# Patient Record
Sex: FEMALE | URBAN - METROPOLITAN AREA
[De-identification: names, ages, dates, MRNs, and addresses within clinical notes are randomized per-mention and may not be internally consistent; named-entity substitution may affect disease eponyms.]

---

## 2024-05-08 SDOH — HEALTH STABILITY: PHYSICAL HEALTH: ON AVERAGE, HOW MANY DAYS PER WEEK DO YOU ENGAGE IN MODERATE TO STRENUOUS EXERCISE (LIKE A BRISK WALK)?: 3 DAYS

## 2024-05-08 SDOH — HEALTH STABILITY: PHYSICAL HEALTH: ON AVERAGE, HOW MANY MINUTES DO YOU ENGAGE IN EXERCISE AT THIS LEVEL?: 30 MIN

## 2024-05-09 ENCOUNTER — OFFICE VISIT (OUTPATIENT)
Dept: FAMILY MEDICINE CLINIC | Age: 24
End: 2024-05-09
Payer: COMMERCIAL

## 2024-05-09 ENCOUNTER — HOSPITAL ENCOUNTER (OUTPATIENT)
Age: 24
Setting detail: SPECIMEN
Discharge: HOME | End: 2024-05-09

## 2024-05-09 VITALS
WEIGHT: 208.6 LBS | BODY MASS INDEX: 31.61 KG/M2 | HEIGHT: 68 IN | HEART RATE: 85 BPM | DIASTOLIC BLOOD PRESSURE: 68 MMHG | SYSTOLIC BLOOD PRESSURE: 108 MMHG | TEMPERATURE: 97.4 F | OXYGEN SATURATION: 97 %

## 2024-05-09 DIAGNOSIS — Z00.00 WELL ADULT EXAM: ICD-10-CM

## 2024-05-09 DIAGNOSIS — Z76.89 ENCOUNTER TO ESTABLISH CARE: Primary | ICD-10-CM

## 2024-05-09 DIAGNOSIS — D50.8 IRON DEFICIENCY ANEMIA SECONDARY TO INADEQUATE DIETARY IRON INTAKE: ICD-10-CM

## 2024-05-09 DIAGNOSIS — G43.C1 INTRACTABLE PERIODIC HEADACHE SYNDROME: ICD-10-CM

## 2024-05-09 DIAGNOSIS — F41.1 GAD (GENERALIZED ANXIETY DISORDER): ICD-10-CM

## 2024-05-09 DIAGNOSIS — F33.1 MODERATE EPISODE OF RECURRENT MAJOR DEPRESSIVE DISORDER (HCC): ICD-10-CM

## 2024-05-09 DIAGNOSIS — Z78.9 VEGETARIAN DIET: ICD-10-CM

## 2024-05-09 LAB
ALBUMIN SERPL-MCNC: 4.4 G/DL (ref 3.5–5.2)
ALBUMIN/GLOB SERPL: 1 {RATIO} (ref 1–2.5)
ALP SERPL-CCNC: 132 U/L (ref 35–104)
ALT SERPL-CCNC: 95 U/L (ref 10–35)
ANION GAP SERPL CALCULATED.3IONS-SCNC: 11 MMOL/L (ref 9–16)
AST SERPL-CCNC: 58 U/L (ref 10–35)
BASOPHILS # BLD: 0.07 K/UL (ref 0–0.2)
BASOPHILS NFR BLD: 1 % (ref 0–2)
BILIRUB SERPL-MCNC: 0.4 MG/DL (ref 0–1.2)
BUN SERPL-MCNC: 8 MG/DL (ref 6–20)
CALCIUM SERPL-MCNC: 9.4 MG/DL (ref 8.6–10.4)
CHLORIDE SERPL-SCNC: 103 MMOL/L (ref 98–107)
CHOLEST SERPL-MCNC: 157 MG/DL (ref 0–199)
CHOLESTEROL/HDL RATIO: 6
CO2 SERPL-SCNC: 25 MMOL/L (ref 20–31)
CREAT SERPL-MCNC: 0.6 MG/DL (ref 0.5–0.9)
EOSINOPHIL # BLD: 0.14 K/UL (ref 0–0.4)
EOSINOPHILS RELATIVE PERCENT: 2 % (ref 1–4)
ERYTHROCYTE [DISTWIDTH] IN BLOOD BY AUTOMATED COUNT: 14.6 % (ref 11.8–14.4)
EST. AVERAGE GLUCOSE BLD GHB EST-MCNC: 117 MG/DL
FERRITIN SERPL-MCNC: 12 NG/ML (ref 13–150)
FOLATE SERPL-MCNC: 9 NG/ML (ref 4.8–24.2)
GFR, ESTIMATED: >90 ML/MIN/1.73M2
GLUCOSE SERPL-MCNC: 93 MG/DL (ref 74–99)
HBA1C MFR BLD: 5.7 % (ref 4–6)
HCT VFR BLD AUTO: 40.9 % (ref 36.3–47.1)
HDLC SERPL-MCNC: 27 MG/DL
HGB BLD-MCNC: 12.3 G/DL (ref 11.9–15.1)
IMM GRANULOCYTES # BLD AUTO: 0 K/UL (ref 0–0.3)
IMM GRANULOCYTES NFR BLD: 0 %
IRON SATN MFR SERPL: 7 % (ref 20–55)
IRON SERPL-MCNC: 34 UG/DL (ref 37–145)
LDLC SERPL CALC-MCNC: 99 MG/DL (ref 0–100)
LYMPHOCYTES NFR BLD: 4.26 K/UL (ref 1–4.8)
LYMPHOCYTES RELATIVE PERCENT: 60 % (ref 24–44)
MCH RBC QN AUTO: 24.2 PG (ref 25.2–33.5)
MCHC RBC AUTO-ENTMCNC: 30.1 G/DL (ref 28.4–34.8)
MCV RBC AUTO: 80.5 FL (ref 82.6–102.9)
MONOCYTES NFR BLD: 0.43 K/UL (ref 0.1–0.8)
MONOCYTES NFR BLD: 6 % (ref 1–7)
MORPHOLOGY: ABNORMAL
MORPHOLOGY: ABNORMAL
NEUTROPHILS NFR BLD: 31 % (ref 36–66)
NEUTS SEG NFR BLD: 2.2 K/UL (ref 1.8–7.7)
PLATELET # BLD AUTO: 249 K/UL (ref 138–453)
PMV BLD AUTO: 11.4 FL (ref 8.1–13.5)
POTASSIUM SERPL-SCNC: 4.6 MMOL/L (ref 3.7–5.3)
PROT SERPL-MCNC: 7.6 G/DL (ref 6.6–8.7)
RBC # BLD AUTO: 5.08 M/UL (ref 3.95–5.11)
SODIUM SERPL-SCNC: 139 MMOL/L (ref 136–145)
TIBC SERPL-MCNC: 484 UG/DL (ref 250–450)
TRIGL SERPL-MCNC: 156 MG/DL (ref 0–149)
TSH SERPL DL<=0.05 MIU/L-ACNC: 0.95 UIU/ML (ref 0.27–4.2)
UNSATURATED IRON BINDING CAPACITY: 450 UG/DL (ref 112–347)
VIT B12 SERPL-MCNC: 445 PG/ML (ref 232–1245)
VLDLC SERPL CALC-MCNC: 31 MG/DL
WBC OTHER # BLD: 7.1 K/UL (ref 3.5–11.3)

## 2024-05-09 PROCEDURE — 99385 PREV VISIT NEW AGE 18-39: CPT

## 2024-05-09 RX ORDER — IBUPROFEN 800 MG/1
800 TABLET ORAL EVERY 8 HOURS PRN
COMMUNITY
Start: 2021-07-20

## 2024-05-09 RX ORDER — VENLAFAXINE HYDROCHLORIDE 75 MG/1
75 CAPSULE, EXTENDED RELEASE ORAL DAILY
COMMUNITY
Start: 2024-05-09

## 2024-05-09 RX ORDER — MECLIZINE HCL 12.5 MG/1
25 TABLET ORAL 4 TIMES DAILY PRN
COMMUNITY
Start: 2022-04-02

## 2024-05-09 RX ORDER — FEXOFENADINE HCL 60 MG/1
60 TABLET, FILM COATED ORAL PRN
COMMUNITY

## 2024-05-09 RX ORDER — ONDANSETRON 4 MG/1
4 TABLET, ORALLY DISINTEGRATING ORAL EVERY 8 HOURS PRN
COMMUNITY
Start: 2024-05-03

## 2024-05-09 SDOH — ECONOMIC STABILITY: FOOD INSECURITY: WITHIN THE PAST 12 MONTHS, THE FOOD YOU BOUGHT JUST DIDN'T LAST AND YOU DIDN'T HAVE MONEY TO GET MORE.: NEVER TRUE

## 2024-05-09 SDOH — ECONOMIC STABILITY: FOOD INSECURITY: WITHIN THE PAST 12 MONTHS, YOU WORRIED THAT YOUR FOOD WOULD RUN OUT BEFORE YOU GOT MONEY TO BUY MORE.: NEVER TRUE

## 2024-05-09 SDOH — ECONOMIC STABILITY: HOUSING INSECURITY
IN THE LAST 12 MONTHS, WAS THERE A TIME WHEN YOU DID NOT HAVE A STEADY PLACE TO SLEEP OR SLEPT IN A SHELTER (INCLUDING NOW)?: NO

## 2024-05-09 SDOH — ECONOMIC STABILITY: INCOME INSECURITY: HOW HARD IS IT FOR YOU TO PAY FOR THE VERY BASICS LIKE FOOD, HOUSING, MEDICAL CARE, AND HEATING?: NOT HARD AT ALL

## 2024-05-09 ASSESSMENT — PATIENT HEALTH QUESTIONNAIRE - PHQ9
4. FEELING TIRED OR HAVING LITTLE ENERGY: SEVERAL DAYS
SUM OF ALL RESPONSES TO PHQ QUESTIONS 1-9: 7
SUM OF ALL RESPONSES TO PHQ9 QUESTIONS 1 & 2: 3
SUM OF ALL RESPONSES TO PHQ QUESTIONS 1-9: 7
3. TROUBLE FALLING OR STAYING ASLEEP: SEVERAL DAYS
2. FEELING DOWN, DEPRESSED OR HOPELESS: MORE THAN HALF THE DAYS
9. THOUGHTS THAT YOU WOULD BE BETTER OFF DEAD, OR OF HURTING YOURSELF: SEVERAL DAYS
6. FEELING BAD ABOUT YOURSELF - OR THAT YOU ARE A FAILURE OR HAVE LET YOURSELF OR YOUR FAMILY DOWN: SEVERAL DAYS
10. IF YOU CHECKED OFF ANY PROBLEMS, HOW DIFFICULT HAVE THESE PROBLEMS MADE IT FOR YOU TO DO YOUR WORK, TAKE CARE OF THINGS AT HOME, OR GET ALONG WITH OTHER PEOPLE: VERY DIFFICULT
SUM OF ALL RESPONSES TO PHQ QUESTIONS 1-9: 7
8. MOVING OR SPEAKING SO SLOWLY THAT OTHER PEOPLE COULD HAVE NOTICED. OR THE OPPOSITE, BEING SO FIGETY OR RESTLESS THAT YOU HAVE BEEN MOVING AROUND A LOT MORE THAN USUAL: NOT AT ALL
7. TROUBLE CONCENTRATING ON THINGS, SUCH AS READING THE NEWSPAPER OR WATCHING TELEVISION: NOT AT ALL
SUM OF ALL RESPONSES TO PHQ QUESTIONS 1-9: 6
5. POOR APPETITE OR OVEREATING: NOT AT ALL
1. LITTLE INTEREST OR PLEASURE IN DOING THINGS: SEVERAL DAYS

## 2024-05-09 ASSESSMENT — COLUMBIA-SUICIDE SEVERITY RATING SCALE - C-SSRS
5. HAVE YOU STARTED TO WORK OUT OR WORKED OUT THE DETAILS OF HOW TO KILL YOURSELF? DO YOU INTEND TO CARRY OUT THIS PLAN?: NO
1. WITHIN THE PAST MONTH, HAVE YOU WISHED YOU WERE DEAD OR WISHED YOU COULD GO TO SLEEP AND NOT WAKE UP?: YES
4. HAVE YOU HAD THESE THOUGHTS AND HAD SOME INTENTION OF ACTING ON THEM?: NO
6. HAVE YOU EVER DONE ANYTHING, STARTED TO DO ANYTHING, OR PREPARED TO DO ANYTHING TO END YOUR LIFE?: NO
3. HAVE YOU BEEN THINKING ABOUT HOW YOU MIGHT KILL YOURSELF?: NO
2. HAVE YOU ACTUALLY HAD ANY THOUGHTS OF KILLING YOURSELF?: YES

## 2024-05-09 NOTE — PROGRESS NOTES
Varsha Sylvester (:  2000) is a 23 y.o. female,New patient, here for evaluation of the following chief complaint(s):  Establish Care          Subjective   SUBJECTIVE/OBJECTIVE:  Pt here today to establish care  VSS    PMH significant for MDD, VIVIEN, periodic HA, iron deficiency d/t vegetarian diet    Pt follows w/ psych q3 months, no longer in CBT  She is on Effexor only and feels this \"is ok\"  States she has been medicated since a teenager so she's uncertain what has helped in the past or what is a sx for her anymore  Does admit to daily depression, has intrusive thoughts at times but no plan  Feels she is generally coping well    Pt has intermittent HA  Made this appt after having a HA that lasted 7 days, this has not been her typical experience w/ HA in the past  Typically can be managed w/ OTC tylenol/Excedrin  Acute sx have resolved since, pt has never tried any prescription strength abortive    Has not completed screening labs for some time, agreeable to do so          Review of Systems       Objective   Physical Exam  Vitals and nursing note reviewed.   Constitutional:       General: She is not in acute distress.     Appearance: Normal appearance. She is not ill-appearing, toxic-appearing or diaphoretic.   Neck:      Vascular: No carotid bruit.   Cardiovascular:      Rate and Rhythm: Normal rate and regular rhythm.      Pulses: Normal pulses.      Heart sounds: Normal heart sounds.   Pulmonary:      Effort: Pulmonary effort is normal.      Breath sounds: Normal breath sounds.   Musculoskeletal:      Right lower leg: No edema.      Left lower leg: No edema.   Skin:     General: Skin is warm and dry.   Neurological:      Mental Status: She is alert.              Assessment & Plan   ASSESSMENT/PLAN:  1. Encounter to establish care  2. Well adult exam  -     CBC with Auto Differential; Future  -     Comprehensive Metabolic Panel; Future  -     Hemoglobin A1C; Future  -     TSH with Reflex; Future  -

## 2024-05-10 DIAGNOSIS — R79.89 ELEVATED LFTS: Primary | ICD-10-CM

## 2024-05-17 ENCOUNTER — OFFICE VISIT (OUTPATIENT)
Dept: FAMILY MEDICINE CLINIC | Age: 24
End: 2024-05-17

## 2024-05-17 VITALS
WEIGHT: 207 LBS | HEART RATE: 108 BPM | TEMPERATURE: 97.5 F | BODY MASS INDEX: 31.94 KG/M2 | SYSTOLIC BLOOD PRESSURE: 108 MMHG | DIASTOLIC BLOOD PRESSURE: 74 MMHG | OXYGEN SATURATION: 97 %

## 2024-05-17 DIAGNOSIS — J02.9 SORE THROAT: Primary | ICD-10-CM

## 2024-05-17 DIAGNOSIS — H61.21 IMPACTED CERUMEN OF RIGHT EAR: ICD-10-CM

## 2024-05-17 DIAGNOSIS — J03.90 TONSILLITIS: ICD-10-CM

## 2024-05-17 DIAGNOSIS — B37.9 ANTIBIOTIC-INDUCED YEAST INFECTION: ICD-10-CM

## 2024-05-17 DIAGNOSIS — T36.95XA ANTIBIOTIC-INDUCED YEAST INFECTION: ICD-10-CM

## 2024-05-17 DIAGNOSIS — Z87.09 HISTORY OF RECURRENT TONSILLITIS: ICD-10-CM

## 2024-05-17 LAB — S PYO AG THROAT QL: NORMAL

## 2024-05-17 RX ORDER — PREDNISONE 10 MG/1
10 TABLET ORAL 2 TIMES DAILY
Qty: 10 TABLET | Refills: 0 | Status: SHIPPED | OUTPATIENT
Start: 2024-05-17 | End: 2024-05-22

## 2024-05-17 RX ORDER — AMOXICILLIN 500 MG/1
500 CAPSULE ORAL 2 TIMES DAILY
Qty: 14 CAPSULE | Refills: 0 | Status: SHIPPED | OUTPATIENT
Start: 2024-05-17 | End: 2024-05-24

## 2024-05-17 RX ORDER — FLUCONAZOLE 150 MG/1
150 TABLET ORAL ONCE
Qty: 1 TABLET | Refills: 0 | Status: SHIPPED | OUTPATIENT
Start: 2024-05-17 | End: 2024-05-17

## 2024-05-17 ASSESSMENT — PATIENT HEALTH QUESTIONNAIRE - PHQ9
SUM OF ALL RESPONSES TO PHQ QUESTIONS 1-9: 0
4. FEELING TIRED OR HAVING LITTLE ENERGY: NOT AT ALL
SUM OF ALL RESPONSES TO PHQ9 QUESTIONS 1 & 2: 0
8. MOVING OR SPEAKING SO SLOWLY THAT OTHER PEOPLE COULD HAVE NOTICED. OR THE OPPOSITE, BEING SO FIGETY OR RESTLESS THAT YOU HAVE BEEN MOVING AROUND A LOT MORE THAN USUAL: NOT AT ALL
3. TROUBLE FALLING OR STAYING ASLEEP: NOT AT ALL
7. TROUBLE CONCENTRATING ON THINGS, SUCH AS READING THE NEWSPAPER OR WATCHING TELEVISION: NOT AT ALL
9. THOUGHTS THAT YOU WOULD BE BETTER OFF DEAD, OR OF HURTING YOURSELF: NOT AT ALL
6. FEELING BAD ABOUT YOURSELF - OR THAT YOU ARE A FAILURE OR HAVE LET YOURSELF OR YOUR FAMILY DOWN: NOT AT ALL
5. POOR APPETITE OR OVEREATING: NOT AT ALL
2. FEELING DOWN, DEPRESSED OR HOPELESS: NOT AT ALL
SUM OF ALL RESPONSES TO PHQ QUESTIONS 1-9: 0
10. IF YOU CHECKED OFF ANY PROBLEMS, HOW DIFFICULT HAVE THESE PROBLEMS MADE IT FOR YOU TO DO YOUR WORK, TAKE CARE OF THINGS AT HOME, OR GET ALONG WITH OTHER PEOPLE: NOT DIFFICULT AT ALL
1. LITTLE INTEREST OR PLEASURE IN DOING THINGS: NOT AT ALL

## 2024-05-17 NOTE — PROGRESS NOTES
Malgorzata Sylvester (:  2000) is a 23 y.o. female,Established patient, here for evaluation of the following chief complaint(s):  Pharyngitis (Pt c/o Sore throat  x 4 days getting worse since yesterday no other sx. Per pt)          Subjective   SUBJECTIVE/OBJECTIVE:  Pt here today for sick visit  VSS    Reports sore throat began approx 3 days ago  Has had hx of recurrent tonsillitis, began Ibuprofen and salt water gargle  Sx are worsening  Denies fevers, chills  No sick contacts    Pt states this occurs often enough she would like an ENT eval for possible tonsillectomy        Review of Systems       Objective   Physical Exam  Vitals and nursing note reviewed.   Constitutional:       General: She is not in acute distress.     Appearance: Normal appearance. She is not ill-appearing, toxic-appearing or diaphoretic.   HENT:      Right Ear: There is impacted cerumen.      Left Ear: Tympanic membrane and ear canal normal.      Mouth/Throat:      Pharynx: Pharyngeal swelling and posterior oropharyngeal erythema present. No oropharyngeal exudate or uvula swelling.      Tonsils: No tonsillar abscesses. 3+ on the right. 3+ on the left.   Neurological:      Mental Status: She is alert.              Assessment & Plan   ASSESSMENT/PLAN:  1. Sore throat  2. Tonsillitis  -strep negative  -steroid and amx for coverage    -     amoxicillin (AMOXIL) 500 MG capsule; Take 1 capsule by mouth 2 times daily for 7 days, Disp-14 capsule, R-0Normal  -     predniSONE (DELTASONE) 10 MG tablet; Take 1 tablet by mouth 2 times daily for 5 days, Disp-10 tablet, R-0Normal  -     POCT rapid strep A    3. History of recurrent tonsillitis  -     [facility] - Jhonny Alfonso, , Otolaryngology, [place]    4. Impacted cerumen of right ear  -irrigated in office, tolerated well    -     20948 - [place] REMOVAL IMPACTED CERUMEN IRRIGATION/LVG UNILAT    5. Antibiotic-induced yeast infection  -pt request    -     fluconazole (DIFLUCAN) 150 MG tablet; Take 1 tablet

## 2024-05-23 ENCOUNTER — HOSPITAL ENCOUNTER (OUTPATIENT)
Age: 24
Setting detail: SPECIMEN
Discharge: HOME | End: 2024-05-23

## 2024-05-23 DIAGNOSIS — R79.89 ELEVATED LFTS: ICD-10-CM

## 2024-05-23 LAB
ALBUMIN SERPL-MCNC: 4.1 G/DL (ref 3.5–5.2)
ALBUMIN/GLOB SERPL: 2 {RATIO} (ref 1–2.5)
ALP SERPL-CCNC: 100 U/L (ref 35–104)
ALT SERPL-CCNC: 45 U/L (ref 10–35)
ANION GAP SERPL CALCULATED.3IONS-SCNC: 11 MMOL/L (ref 9–16)
AST SERPL-CCNC: 23 U/L (ref 10–35)
BILIRUB SERPL-MCNC: 0.3 MG/DL (ref 0–1.2)
BUN SERPL-MCNC: 12 MG/DL (ref 6–20)
CALCIUM SERPL-MCNC: 8.8 MG/DL (ref 8.6–10.4)
CHLORIDE SERPL-SCNC: 106 MMOL/L (ref 98–107)
CO2 SERPL-SCNC: 23 MMOL/L (ref 20–31)
CREAT SERPL-MCNC: 0.7 MG/DL (ref 0.5–0.9)
GFR, ESTIMATED: >90 ML/MIN/1.73M2
GLUCOSE SERPL-MCNC: 94 MG/DL (ref 74–99)
POTASSIUM SERPL-SCNC: 3.9 MMOL/L (ref 3.7–5.3)
PROT SERPL-MCNC: 6.5 G/DL (ref 6.6–8.7)
SODIUM SERPL-SCNC: 140 MMOL/L (ref 136–145)

## 2024-06-12 ENCOUNTER — PATIENT MESSAGE (OUTPATIENT)
Dept: FAMILY MEDICINE CLINIC | Age: 24
End: 2024-06-12

## 2024-06-12 NOTE — TELEPHONE ENCOUNTER
From: Malgorzata Sylvester  To: Tameka Del Valle  Sent: 6/12/2024 8:36 AM EDT  Subject: Painful and swollen eye    Hello! Unfortunately, I think I may have a stye or something because my eye has been pretty painful and swollen the last few days. Is there anything I can get over-the-counter or something I can get prescribed to help this go away faster and help with my symptoms?

## 2024-10-06 SDOH — HEALTH STABILITY: PHYSICAL HEALTH: ON AVERAGE, HOW MANY DAYS PER WEEK DO YOU ENGAGE IN MODERATE TO STRENUOUS EXERCISE (LIKE A BRISK WALK)?: 4 DAYS

## 2024-10-06 SDOH — HEALTH STABILITY: PHYSICAL HEALTH: ON AVERAGE, HOW MANY MINUTES DO YOU ENGAGE IN EXERCISE AT THIS LEVEL?: 30 MIN

## 2024-10-07 ENCOUNTER — OFFICE VISIT (OUTPATIENT)
Dept: FAMILY MEDICINE CLINIC | Age: 24
End: 2024-10-07
Payer: COMMERCIAL

## 2024-10-07 VITALS
SYSTOLIC BLOOD PRESSURE: 98 MMHG | HEART RATE: 90 BPM | OXYGEN SATURATION: 98 % | DIASTOLIC BLOOD PRESSURE: 60 MMHG | TEMPERATURE: 97.8 F | HEIGHT: 67 IN | WEIGHT: 208.2 LBS | BODY MASS INDEX: 32.68 KG/M2

## 2024-10-07 DIAGNOSIS — R73.03 PREDIABETES: Primary | ICD-10-CM

## 2024-10-07 DIAGNOSIS — R53.83 FATIGUE, UNSPECIFIED TYPE: ICD-10-CM

## 2024-10-07 DIAGNOSIS — Z71.3 DIETARY COUNSELING: ICD-10-CM

## 2024-10-07 DIAGNOSIS — Z78.9 STRICT VEGETARIAN DIET: ICD-10-CM

## 2024-10-07 LAB — HBA1C MFR BLD: 6 %

## 2024-10-07 PROCEDURE — G8417 CALC BMI ABV UP PARAM F/U: HCPCS

## 2024-10-07 PROCEDURE — 83036 HEMOGLOBIN GLYCOSYLATED A1C: CPT

## 2024-10-07 PROCEDURE — 99213 OFFICE O/P EST LOW 20 MIN: CPT

## 2024-10-07 ASSESSMENT — PATIENT HEALTH QUESTIONNAIRE - PHQ9
8. MOVING OR SPEAKING SO SLOWLY THAT OTHER PEOPLE COULD HAVE NOTICED. OR THE OPPOSITE, BEING SO FIGETY OR RESTLESS THAT YOU HAVE BEEN MOVING AROUND A LOT MORE THAN USUAL: NOT AT ALL
SUM OF ALL RESPONSES TO PHQ QUESTIONS 1-9: 3
1. LITTLE INTEREST OR PLEASURE IN DOING THINGS: NOT AT ALL
2. FEELING DOWN, DEPRESSED OR HOPELESS: SEVERAL DAYS
SUM OF ALL RESPONSES TO PHQ QUESTIONS 1-9: 3
SUM OF ALL RESPONSES TO PHQ QUESTIONS 1-9: 3
10. IF YOU CHECKED OFF ANY PROBLEMS, HOW DIFFICULT HAVE THESE PROBLEMS MADE IT FOR YOU TO DO YOUR WORK, TAKE CARE OF THINGS AT HOME, OR GET ALONG WITH OTHER PEOPLE: NOT DIFFICULT AT ALL
5. POOR APPETITE OR OVEREATING: NOT AT ALL
SUM OF ALL RESPONSES TO PHQ QUESTIONS 1-9: 3
6. FEELING BAD ABOUT YOURSELF - OR THAT YOU ARE A FAILURE OR HAVE LET YOURSELF OR YOUR FAMILY DOWN: NOT AT ALL
SUM OF ALL RESPONSES TO PHQ9 QUESTIONS 1 & 2: 1
4. FEELING TIRED OR HAVING LITTLE ENERGY: SEVERAL DAYS
7. TROUBLE CONCENTRATING ON THINGS, SUCH AS READING THE NEWSPAPER OR WATCHING TELEVISION: NOT AT ALL
3. TROUBLE FALLING OR STAYING ASLEEP: SEVERAL DAYS
9. THOUGHTS THAT YOU WOULD BE BETTER OFF DEAD, OR OF HURTING YOURSELF: NOT AT ALL

## 2024-10-07 ASSESSMENT — ENCOUNTER SYMPTOMS
DIARRHEA: 0
WHEEZING: 0
NAUSEA: 0
CONSTIPATION: 0
SHORTNESS OF BREATH: 0
BACK PAIN: 0
ABDOMINAL PAIN: 0
COUGH: 0
VOMITING: 0

## 2024-10-07 NOTE — ASSESSMENT & PLAN NOTE
A1c trending up from 5.7-6, discussed diabetic diet, will refer to nutritionist service given patient history of strict vegetarian diet to help with an increase protein and decrease carbs in diet.  Will follow-up with A1c in 3 months    Orders:    POCT glycosylated hemoglobin (Hb A1C)    [facility]- [facility]    Hemoglobin A1C; Future

## 2024-10-07 NOTE — ASSESSMENT & PLAN NOTE
Orders:     BMI ABOVE NORMAL F/U    [facility]- [facility]

## 2024-10-07 NOTE — ASSESSMENT & PLAN NOTE
With iron deficiency on the previous labs and possible vitamin deficiency will check, low protein will check a prealbumin to rule out malnutrition    Orders:    [facility]- [facility]    Vitamin B6; Future    Vitamin B12 & Folate; Future    Vitamin D 25 Hydroxy; Future    Prealbumin; Future

## 2024-10-07 NOTE — PROGRESS NOTES
Malgorzata Sylvester (:  2000) is a 24 y.o. female,Established patient, here for evaluation of the following chief complaint(s):  Establish Care    H&P    This is a 24 years old female with Hx of strict vegetarian diet, iron deficiency hypoproteinemia, hypertriglyceridemia.    Here for follow-up    Patient reported no concerns.  Stating that she is following strict vegetarian diet.  Taking no vitamines    Menstruation: No LMP recorded. (Menstrual status: IUD).     Screening for Depression: Adult population (age 18 and older)  - PHQ-9 score today:PHQ-2 Score: 1  PHQ-9 Total Score: 1  Interpretation:  5-9 mild   10-14 moderate   15-19 moderately severe   20-27 severe     Monitor HyperTG and low HD: Needs Annual Labs   Screening for CVDs risk:  The ASCVD Risk score (Reyna HART, et al., 2019) failed to calculate for the following reasons:    The 2019 ASCVD risk score is only valid for ages 40 to 79     Regular Annual Weight, Height and BMI percentile and BMI Monitorin-34.9 - Obesity Grade I  Adults with a BMI 25-39.9 (overweight and obese) would be allowed 4 preventive health office visits and unlimited nutritional counseling visits specifically for obesity per year and one (1) set of recommended laboratory studies (lipid profile, hemoglobin A1c, AST, ALT and fasting glucose.  Last Weight Metrics:      10/7/2024     9:04 AM 2024     1:18 PM 2024     3:02 PM 2024     1:37 PM   Weight Loss Metrics   Height 5' 7\" 5' 7\"  5' 7.5\"   Weight - Scale 208 lbs 3 oz 206 lbs 6 oz 207 lbs 208 lbs 10 oz   BMI (Calculated) 32.7 kg/m2 32.4 kg/m2 0 kg/m2 32.3 kg/m2          Screening for Cancers:   - Cervical Cancer: Last Pap smear done 3/2022, next due 3/2025  For women aged 21 to 29 years: The USPSTF recommends screening for cervical cancer every 3 years with cervical cytology alone           No data to display                 Social Hx:  Social Determinants of Health with Concerns     Alcohol Use: Not on file

## 2024-10-07 NOTE — ASSESSMENT & PLAN NOTE
See above    Orders:    Vitamin B6; Future    Vitamin B12 & Folate; Future    Vitamin D 25 Hydroxy; Future    Prealbumin; Future

## 2025-01-13 ENCOUNTER — HOSPITAL ENCOUNTER (OUTPATIENT)
Age: 25
Setting detail: SPECIMEN
Discharge: HOME | End: 2025-01-13

## 2025-01-13 ENCOUNTER — OFFICE VISIT (OUTPATIENT)
Dept: FAMILY MEDICINE CLINIC | Age: 25
End: 2025-01-13
Payer: COMMERCIAL

## 2025-01-13 VITALS
DIASTOLIC BLOOD PRESSURE: 70 MMHG | WEIGHT: 215 LBS | BODY MASS INDEX: 33.67 KG/M2 | SYSTOLIC BLOOD PRESSURE: 102 MMHG | HEART RATE: 73 BPM | OXYGEN SATURATION: 98 % | TEMPERATURE: 97.3 F

## 2025-01-13 DIAGNOSIS — R73.03 PREDIABETES: Primary | ICD-10-CM

## 2025-01-13 DIAGNOSIS — Z78.9 STRICT VEGETARIAN DIET: ICD-10-CM

## 2025-01-13 DIAGNOSIS — Z76.89 ENCOUNTER FOR WEIGHT MANAGEMENT: ICD-10-CM

## 2025-01-13 DIAGNOSIS — R73.03 PREDIABETES: ICD-10-CM

## 2025-01-13 DIAGNOSIS — R53.83 FATIGUE, UNSPECIFIED TYPE: ICD-10-CM

## 2025-01-13 DIAGNOSIS — F33.1 MODERATE EPISODE OF RECURRENT MAJOR DEPRESSIVE DISORDER (HCC): ICD-10-CM

## 2025-01-13 DIAGNOSIS — J35.1 CHRONIC TONSILLAR HYPERTROPHY: ICD-10-CM

## 2025-01-13 LAB
25(OH)D3 SERPL-MCNC: 19.3 NG/ML (ref 30–100)
EST. AVERAGE GLUCOSE BLD GHB EST-MCNC: 120 MG/DL
FOLATE SERPL-MCNC: 26 NG/ML (ref 4.8–24.2)
HBA1C MFR BLD: 5.8 %
HBA1C MFR BLD: 5.8 % (ref 4–6)
PREALB SERPL-MCNC: 21.4 MG/DL (ref 20–40)
VIT B12 SERPL-MCNC: 349 PG/ML (ref 232–1245)

## 2025-01-13 PROCEDURE — 90471 IMMUNIZATION ADMIN: CPT

## 2025-01-13 PROCEDURE — 99214 OFFICE O/P EST MOD 30 MIN: CPT

## 2025-01-13 PROCEDURE — 83036 HEMOGLOBIN GLYCOSYLATED A1C: CPT

## 2025-01-13 PROCEDURE — 90661 CCIIV3 VAC ABX FR 0.5 ML IM: CPT

## 2025-01-13 RX ORDER — FLUTICASONE PROPIONATE 50 MCG
2 SPRAY, SUSPENSION (ML) NASAL DAILY
Qty: 16 G | Refills: 0 | Status: SHIPPED | OUTPATIENT
Start: 2025-01-13

## 2025-01-13 RX ORDER — FEXOFENADINE HCL 60 MG/1
60 TABLET, FILM COATED ORAL NIGHTLY
Qty: 60 TABLET | Refills: 1 | Status: SHIPPED | OUTPATIENT
Start: 2025-01-13

## 2025-01-13 SDOH — ECONOMIC STABILITY: FOOD INSECURITY: WITHIN THE PAST 12 MONTHS, THE FOOD YOU BOUGHT JUST DIDN'T LAST AND YOU DIDN'T HAVE MONEY TO GET MORE.: NEVER TRUE

## 2025-01-13 SDOH — ECONOMIC STABILITY: FOOD INSECURITY: WITHIN THE PAST 12 MONTHS, YOU WORRIED THAT YOUR FOOD WOULD RUN OUT BEFORE YOU GOT MONEY TO BUY MORE.: NEVER TRUE

## 2025-01-13 ASSESSMENT — PATIENT HEALTH QUESTIONNAIRE - PHQ9
3. TROUBLE FALLING OR STAYING ASLEEP: NOT AT ALL
5. POOR APPETITE OR OVEREATING: NOT AT ALL
4. FEELING TIRED OR HAVING LITTLE ENERGY: SEVERAL DAYS
7. TROUBLE CONCENTRATING ON THINGS, SUCH AS READING THE NEWSPAPER OR WATCHING TELEVISION: NOT AT ALL
1. LITTLE INTEREST OR PLEASURE IN DOING THINGS: NOT AT ALL
SUM OF ALL RESPONSES TO PHQ QUESTIONS 1-9: 2
6. FEELING BAD ABOUT YOURSELF - OR THAT YOU ARE A FAILURE OR HAVE LET YOURSELF OR YOUR FAMILY DOWN: NOT AT ALL
10. IF YOU CHECKED OFF ANY PROBLEMS, HOW DIFFICULT HAVE THESE PROBLEMS MADE IT FOR YOU TO DO YOUR WORK, TAKE CARE OF THINGS AT HOME, OR GET ALONG WITH OTHER PEOPLE: NOT DIFFICULT AT ALL
SUM OF ALL RESPONSES TO PHQ QUESTIONS 1-9: 2
9. THOUGHTS THAT YOU WOULD BE BETTER OFF DEAD, OR OF HURTING YOURSELF: NOT AT ALL
SUM OF ALL RESPONSES TO PHQ QUESTIONS 1-9: 2
8. MOVING OR SPEAKING SO SLOWLY THAT OTHER PEOPLE COULD HAVE NOTICED. OR THE OPPOSITE, BEING SO FIGETY OR RESTLESS THAT YOU HAVE BEEN MOVING AROUND A LOT MORE THAN USUAL: NOT AT ALL
2. FEELING DOWN, DEPRESSED OR HOPELESS: SEVERAL DAYS
SUM OF ALL RESPONSES TO PHQ QUESTIONS 1-9: 2
SUM OF ALL RESPONSES TO PHQ9 QUESTIONS 1 & 2: 1

## 2025-01-13 ASSESSMENT — ENCOUNTER SYMPTOMS
ABDOMINAL PAIN: 0
CONSTIPATION: 0
NAUSEA: 0
SHORTNESS OF BREATH: 0
WHEEZING: 0
SORE THROAT: 1
BACK PAIN: 0
DIARRHEA: 0
COUGH: 0
VOMITING: 0

## 2025-01-13 NOTE — ASSESSMENT & PLAN NOTE
Chronic, not at goal (unstable), She has enlarged tonsils and nasal inflammation. She was advised to use Flonase twice daily and continue her allergy medication consistently every night until her next visit in May 2025. If the allergy medication causes drowsiness, she can reduce the dose by half. If the allergy medication is ineffective, Singulair may be considered., medication adherence emphasized, and lifestyle modifications recommended    Orders:    fluticasone (FLONASE) 50 MCG/ACT nasal spray; 2 sprays by Each Nostril route daily    fexofenadine (ALLEGRA) 60 MG tablet; Take 1 tablet by mouth at bedtime

## 2025-01-13 NOTE — PROGRESS NOTES
Malgorzata Sylvester (:  2000) is a 24 y.o. female,Established patient, here for evaluation of the following chief complaint(s):  3 Month Follow-Up (Pre Diabetes and weight Follow Up)    H&P    This is a 24 years old female with Hx of strict vegetarian diet, iron deficiency hypoproteinemia, hypertriglyceridemia, chronic bilateral tonsillar hypertrophy complicated by recurrent tonsillitis seen by ENT who recommended tonsillectomy however patient cannot afford it, prediabetes     History of Present Illness  The patient presents for a follow-up visit.    She has experienced weight gain since her last appointment, which she attributes to the holiday season. She is open to pharmacological intervention for her borderline diabetes but expresses concern about the potential need for lifelong medication. Her goal is to achieve a weight of 180 pounds. She is not experiencing any chest pain or shortness of breath.    She continues to use Mirena and experiences intermittent cramping and spotting, but no regular menstrual cycles. She does not currently have a gynecologist and has a Pap smear scheduled for 2025.    She occasionally experiences sore throat symptoms but manages them with fexofenadine or Allegra as needed, primarily during the spring season. She reports no difficulty swallowing or sensation of foreign body in the throat.  Seen before by ENT recommended tonsillectomy however patient cannot afford it due to co-pay of 4000    She is on Effexor and has no issues with it.    She is a vegetarian and has been incorporating more fish into her diet since her last visit. She does not like protein shakes.    Supplemental Information  She uses meclizine as needed for vertigo or dizziness. She takes vitamin D 2000 and prenatal vitamins.    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    MEDICATIONS  Current: fexofenadine, meclizine, vitamin D, Effexor    IMMUNIZATIONS  She received the influenza vaccine.       Menstruation:

## 2025-01-13 NOTE — ASSESSMENT & PLAN NOTE
Chronic, not at goal (unstable), goal of weight 180,C Her blood glucose levels have shown a decrease A1c 5.8 today, but she has experienced weight gain since the last consultation. Her BMI is 33, which does not classify her as morbidly obese, but it does qualify her for weight loss medication. She will be initiated on Wegovy injections, starting with a low dose of 0.25 mg. The potential side effects, including gastrointestinal upset and constipation, were discussed. She was advised to use over-the-counter MiraLAX for constipation relief. A follow-up appointment will be scheduled in 1 month to monitor her progress and adjust the dosage if necessary., medication adherence emphasized, and lifestyle modifications recommended , medication adherence emphasized, and lifestyle modifications recommended          History of seizure HTN (hypertension) Wheezing

## 2025-01-13 NOTE — ASSESSMENT & PLAN NOTE
Chronic, at goal (stable), She is currently taking Effexor and reports no issues. She is also taking prenatal vitamins, which include vitamin D., medication adherence emphasized, and lifestyle modifications recommended

## 2025-01-13 NOTE — ASSESSMENT & PLAN NOTE
With low protein she was advised to incorporate protein shakes into her diet. Samples of Glucerna were provided. Blood work will be ordered to assess her nutritional status.

## 2025-01-13 NOTE — ASSESSMENT & PLAN NOTE
Chronic, not at goal (unstable), Her blood glucose levels have shown a decrease A1c 5.8 today, but she has experienced weight gain since the last consultation. Her BMI is 33, which does not classify her as morbidly obese, but it does qualify her for weight loss medication. She will be initiated on Wegovy injections, starting with a low dose of 0.25 mg. The potential side effects, including gastrointestinal upset and constipation, were discussed. She was advised to use over-the-counter MiraLAX for constipation relief. A follow-up appointment will be scheduled in 1 month to monitor her progress and adjust the dosage if necessary., medication adherence emphasized, and lifestyle modifications recommended    Orders:    POCT glycosylated hemoglobin (Hb A1C)    Semaglutide-Weight Management (WEGOVY) 0.25 MG/0.5ML SOAJ SC injection; Inject 0.25 mg into the skin every 7 days

## 2025-01-14 ENCOUNTER — TELEPHONE (OUTPATIENT)
Dept: FAMILY MEDICINE CLINIC | Age: 25
End: 2025-01-14

## 2025-01-14 NOTE — TELEPHONE ENCOUNTER
Attempted PA with both numbers on file and both [employer] member numbers showing inactive through covermymeds.    Patient called and aware and informed to send a [employer] message with new card information to attempt a new PA for Wegovy

## 2025-01-15 LAB — PYRIDOXAL PHOS SERPL-SCNC: 67.1 NMOL/L (ref 20–125)

## 2025-02-06 ENCOUNTER — PATIENT MESSAGE (OUTPATIENT)
Dept: FAMILY MEDICINE CLINIC | Age: 25
End: 2025-02-06

## 2025-02-06 DIAGNOSIS — R73.03 PREDIABETES: ICD-10-CM

## 2025-02-07 NOTE — TELEPHONE ENCOUNTER
Malgorzata Sylvester is calling to request a refill on the following medication(s):    Medication Request:  Requested Prescriptions     Pending Prescriptions Disp Refills    Semaglutide-Weight Management (WEGOVY) 0.25 MG/0.5ML SOAJ SC injection 2 mL 0     Sig: Inject 0.25 mg into the skin every 7 days       Last Visit Date (If Applicable):  1/13/2025    Next Visit Date:    5/12/2025

## 2025-02-19 DIAGNOSIS — J35.1 CHRONIC TONSILLAR HYPERTROPHY: ICD-10-CM

## 2025-02-19 RX ORDER — FLUTICASONE PROPIONATE 50 MCG
2 SPRAY, SUSPENSION (ML) NASAL DAILY
Qty: 1 EACH | Refills: 4 | Status: SHIPPED | OUTPATIENT
Start: 2025-02-19

## 2025-02-19 NOTE — TELEPHONE ENCOUNTER
Malgorzata Sylvester is calling to request a refill on the following medication(s):    Medication Request:  Requested Prescriptions     Pending Prescriptions Disp Refills    fluticasone (FLONASE) 50 MCG/ACT nasal spray [Pharmacy Med Name: FLUTICASONE PROP 50 MCG SPRAY]       Sig: SPRAY 2 SPRAYS INTO EACH NOSTRIL EVERY DAY       Last Visit Date (If Applicable):  1/13/2025    Next Visit Date:    5/12/2025

## 2025-04-29 ENCOUNTER — PATIENT MESSAGE (OUTPATIENT)
Dept: FAMILY MEDICINE CLINIC | Age: 25
End: 2025-04-29

## 2025-04-29 DIAGNOSIS — R73.03 PREDIABETES: ICD-10-CM

## 2025-04-30 NOTE — TELEPHONE ENCOUNTER
She was supposed to follow-up after 1 month please call patient and schedule her for an appointment for weight management, we cannot change the dose until seen.  Need to be  than the Pap smear that she has on file

## 2025-05-01 NOTE — TELEPHONE ENCOUNTER
Malgorzata Sylvester is calling to request a refill on the following medication(s):    Medication Request:  Requested Prescriptions     Pending Prescriptions Disp Refills    Semaglutide-Weight Management (WEGOVY) 0.25 MG/0.5ML SOAJ SC injection 2 mL 0     Sig: Inject 0.25 mg into the skin every 7 days       Last Visit Date (If Applicable):  1/13/2025    Next Visit Date:    4/29/2025

## 2025-05-21 ENCOUNTER — HOSPITAL ENCOUNTER (OUTPATIENT)
Age: 25
Setting detail: SPECIMEN
Discharge: HOME | End: 2025-05-21

## 2025-05-21 ENCOUNTER — OFFICE VISIT (OUTPATIENT)
Dept: FAMILY MEDICINE CLINIC | Age: 25
End: 2025-05-21
Payer: COMMERCIAL

## 2025-05-21 VITALS
DIASTOLIC BLOOD PRESSURE: 70 MMHG | BODY MASS INDEX: 32.55 KG/M2 | OXYGEN SATURATION: 98 % | SYSTOLIC BLOOD PRESSURE: 118 MMHG | TEMPERATURE: 97.5 F | HEART RATE: 78 BPM | WEIGHT: 207.4 LBS | HEIGHT: 67 IN

## 2025-05-21 DIAGNOSIS — Z01.419 ENCOUNTER FOR CERVICAL PAP SMEAR WITH PELVIC EXAM: ICD-10-CM

## 2025-05-21 DIAGNOSIS — Z01.419 ENCOUNTER FOR CERVICAL PAP SMEAR WITH PELVIC EXAM: Primary | ICD-10-CM

## 2025-05-21 NOTE — PROGRESS NOTES
Malgorzata Sylvester (:  2000) is a 24 y.o. female,Established patient, here for evaluation of the following chief complaint(s):  Pap Smear    Assessment & Plan          Assessment & Plan  Encounter for cervical Pap smear with pelvic exam   1. Well-woman examination  - Cervix appears normal upon examination  - Pap smear conducted today, cytology without HPV testing due to age  - Discussed and ordered testing for sexually transmitted diseases (STDs)  - Advised to abstain from sexual activity for 24 hours post-examination  - Results expected within a week    PROCEDURE  Pre-Procedure Diagnosis  Discussed the importance of screening for cervical cancer and the benefits of early detection. Risks include minor discomfort during the procedure.    Procedure Performed  Pap smear with cytology (no HPV testing)    Technique  Time-Out: Confirmed patient's identity, procedure, and site.  Site Preparation: Patient positioned appropriately on the examination table, feet together and knees pushed up.    Post-Procedure Diagnosis  Minor discomfort and spotting post-procedure.    Post-Procedure  Tolerance Level: Patient tolerated the procedure well.  Home Care Instructions: Advised no sexual activity for 24 hours post-procedure. Results will be available in about a week.    Orders:    PAP Smear; Future    Chlamydia/GC DNA, Thin Prep; Future    Vaginitis DNA Probe; Future      No follow-ups on file.       Subjective   HPI  History of Present Illness  The patient is a 24-year-old female who presents for a well-woman exam.    Contraception  - She has been using an intrauterine device (IUD) for contraception since , which has resulted in amenorrhea.  - She reports no abnormal vaginal discharge and is sexually active.    Gynecological History  - She has no children.  - Her last Pap smear was conducted in , without concurrent HPV testing.  - She reports no history of vaginal sores.    Breast Health  - She regularly performs

## 2025-05-22 LAB
CANDIDA SPECIES: NEGATIVE
GARDNERELLA VAGINALIS: POSITIVE
SOURCE: ABNORMAL
TRICHOMONAS: NEGATIVE

## 2025-05-23 LAB
C TRACH DNA SPEC QL PROBE+SIG AMP: ABNORMAL
N GONORRHOEA DNA SPEC QL PROBE+SIG AMP: ABNORMAL
SPECIMEN DESCRIPTION: ABNORMAL

## 2025-05-27 ENCOUNTER — OFFICE VISIT (OUTPATIENT)
Age: 25
End: 2025-05-27
Payer: COMMERCIAL

## 2025-05-27 ENCOUNTER — RESULTS FOLLOW-UP (OUTPATIENT)
Dept: FAMILY MEDICINE CLINIC | Age: 25
End: 2025-05-27

## 2025-05-27 VITALS
BODY MASS INDEX: 32.33 KG/M2 | OXYGEN SATURATION: 98 % | HEART RATE: 89 BPM | TEMPERATURE: 98.1 F | WEIGHT: 206.4 LBS | SYSTOLIC BLOOD PRESSURE: 102 MMHG | DIASTOLIC BLOOD PRESSURE: 68 MMHG

## 2025-05-27 DIAGNOSIS — R73.03 PREDIABETES: Primary | ICD-10-CM

## 2025-05-27 DIAGNOSIS — N89.8 VAGINAL DISCHARGE: ICD-10-CM

## 2025-05-27 DIAGNOSIS — Z76.89 ENCOUNTER FOR WEIGHT MANAGEMENT: ICD-10-CM

## 2025-05-27 DIAGNOSIS — N76.0 BACTERIAL VAGINOSIS: ICD-10-CM

## 2025-05-27 DIAGNOSIS — B96.89 BACTERIAL VAGINOSIS: ICD-10-CM

## 2025-05-27 PROCEDURE — 99214 OFFICE O/P EST MOD 30 MIN: CPT

## 2025-05-27 NOTE — PROGRESS NOTES
Malgorzata Sylvester (:  2000) is a 25 y.o. female,Established patient, here for evaluation of the following chief complaint(s):  Weight Management (Review Results)    H&P    This is a 24 years old female with Hx of strict vegetarian diet, iron deficiency hypoproteinemia, hypertriglyceridemia, chronic bilateral tonsillar hypertrophy complicated by recurrent tonsillitis seen by ENT who recommended tonsillectomy however patient cannot afford it, prediabetes     History of Present Illness  The patient presents for weight management and bacterial vaginosis.    Weight Management  - She has been on a regimen of Wegovy 0.25 mg for the past few months, which she procures from [employer].  - Despite an initial weight loss, her progress has plateaued, with her weight fluctuating around 202 pounds.  - The medication was prescribed in 2025, but she did not start it until 2025.  - She experienced nausea during the initial weeks of treatment but did not have any episodes of vomiting.  - She also reported constipation, which was managed with over-the-counter stool softeners.  - Currently, she reports no such issues and believes her body has acclimated to the medication.  - Her goal is to achieve a weight of approximately 180 pounds.  - She consumes alcohol occasionally.  - She administers the injections on  and maintains a regular work schedule throughout the week.    Bacterial Vaginosis  - She reports occasional itching when she perspires excessively but does not experience this symptom regularly.  - She has been informed that her discharge appears normal.    Supplemental information: None.    SOCIAL HISTORY  - Drinks alcohol occasionally    FAMILY HISTORY  - No family history of thyroid cancer or pancreatic cancer    Supplemental Information  She uses meclizine as needed for vertigo or dizziness. She takes vitamin D 2000 and prenatal vitamins.    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    MEDICATIONS  Current:

## 2025-05-30 LAB
CHLAMYDIA DNA UR QL NAA+PROBE: NEGATIVE
N GONORRHOEA DNA UR QL NAA+PROBE: NEGATIVE
SPECIMEN DESCRIPTION: NORMAL

## 2025-06-02 LAB — CYTOLOGY REPORT: NORMAL

## 2025-07-15 ENCOUNTER — HOSPITAL ENCOUNTER (OUTPATIENT)
Age: 25
Setting detail: SPECIMEN
Discharge: HOME | End: 2025-07-15

## 2025-07-15 ENCOUNTER — OFFICE VISIT (OUTPATIENT)
Age: 25
End: 2025-07-15
Payer: COMMERCIAL

## 2025-07-15 VITALS
WEIGHT: 201.8 LBS | SYSTOLIC BLOOD PRESSURE: 110 MMHG | DIASTOLIC BLOOD PRESSURE: 71 MMHG | HEART RATE: 61 BPM | BODY MASS INDEX: 31.61 KG/M2

## 2025-07-15 DIAGNOSIS — B96.89 BACTERIAL VAGINOSIS: ICD-10-CM

## 2025-07-15 DIAGNOSIS — N89.8 VAGINAL DISCHARGE: Primary | ICD-10-CM

## 2025-07-15 DIAGNOSIS — R30.0 DYSURIA: ICD-10-CM

## 2025-07-15 DIAGNOSIS — N76.0 BACTERIAL VAGINOSIS: ICD-10-CM

## 2025-07-15 LAB
BACTERIA URNS QL MICRO: ABNORMAL
BILIRUB UR QL STRIP: NEGATIVE
BILIRUBIN, POC: NORMAL
BLOOD URINE, POC: NORMAL
CANDIDA SPECIES: NEGATIVE
CASTS #/AREA URNS LPF: ABNORMAL /LPF (ref 0–8)
CLARITY UR: ABNORMAL
CLARITY, POC: NORMAL
COLOR UR: YELLOW
COLOR, POC: NORMAL
CONTROL: PRESENT
EPI CELLS #/AREA URNS HPF: ABNORMAL /HPF (ref 0–5)
GARDNERELLA VAGINALIS: POSITIVE
GLUCOSE UR STRIP-MCNC: NEGATIVE MG/DL
GLUCOSE URINE, POC: NEGATIVE MG/DL
HGB UR QL STRIP.AUTO: ABNORMAL
KETONES UR STRIP-MCNC: ABNORMAL MG/DL
KETONES, POC: NORMAL MG/DL
LEUKOCYTE EST, POC: NORMAL
LEUKOCYTE ESTERASE UR QL STRIP: ABNORMAL
NITRITE UR QL STRIP: NEGATIVE
NITRITE, POC: NEGATIVE
PH UR STRIP: 6.5 [PH] (ref 5–8)
PH, POC: 7
PREGNANCY TEST URINE, POC: NEGATIVE
PROT UR STRIP-MCNC: ABNORMAL MG/DL
PROTEIN, POC: NORMAL MG/DL
RBC #/AREA URNS HPF: ABNORMAL /HPF (ref 0–4)
SOURCE: ABNORMAL
SP GR UR STRIP: 1.02 (ref 1–1.03)
SPECIFIC GRAVITY, POC: 1.02
TRICHOMONAS: NEGATIVE
UROBILINOGEN UR STRIP-ACNC: NORMAL EU/DL (ref 0–1)
UROBILINOGEN, POC: NORMAL MG/DL
WBC #/AREA URNS HPF: ABNORMAL /HPF (ref 0–5)

## 2025-07-15 PROCEDURE — 81025 URINE PREGNANCY TEST: CPT

## 2025-07-15 PROCEDURE — 81002 URINALYSIS NONAUTO W/O SCOPE: CPT

## 2025-07-15 PROCEDURE — 99214 OFFICE O/P EST MOD 30 MIN: CPT

## 2025-07-15 RX ORDER — FLUCONAZOLE 150 MG/1
150 TABLET ORAL
Qty: 2 TABLET | Refills: 0 | Status: SHIPPED | OUTPATIENT
Start: 2025-07-15 | End: 2025-07-21

## 2025-07-15 RX ORDER — CEPHALEXIN 500 MG/1
500 CAPSULE ORAL 2 TIMES DAILY
Qty: 14 CAPSULE | Refills: 0 | Status: SHIPPED | OUTPATIENT
Start: 2025-07-15 | End: 2025-07-22

## 2025-07-15 RX ORDER — METRONIDAZOLE 500 MG/1
500 TABLET ORAL 2 TIMES DAILY
Qty: 14 TABLET | Refills: 0 | Status: SHIPPED | OUTPATIENT
Start: 2025-07-15 | End: 2025-07-22

## 2025-07-15 NOTE — PROGRESS NOTES
Malgorzata Sylvester (:  2000) is a 25 y.o. female,Established patient, here for evaluation of the following chief complaint(s):  Vaginal Itching (Vaginal soreness and red )    Assessment & Plan  Diagnoses and all orders for this visit:  Vaginal discharge  -     POCT urine pregnancy  -     fluconazole (DIFLUCAN) 150 MG tablet; Take 1 tablet by mouth every 72 hours for 6 days  Bacterial vaginosis  -     Vaginitis DNA Probe; Future  -     POCT urine pregnancy  -     fluconazole (DIFLUCAN) 150 MG tablet; Take 1 tablet by mouth every 72 hours for 6 days  Dysuria  -     POCT Urinalysis Dipstick no Micro  -     POCT urine pregnancy  -     Urinalysis with Reflex to Culture; Future  -     fluconazole (DIFLUCAN) 150 MG tablet; Take 1 tablet by mouth every 72 hours for 6 days  Other orders  -     metroNIDAZOLE (FLAGYL) 500 MG tablet; Take 1 tablet by mouth 2 times daily for 7 days  -     cephALEXin (KEFLEX) 500 MG capsule; Take 1 capsule by mouth 2 times daily for 7 days     1. Bacterial vaginosis  - Reports soreness, itching in the vaginal area, occasional burning during urination, and mucousy bloody discharge  - Speculum exam revealed significant irritation and discharge  - Swab will be taken to confirm the diagnosis  - Flagyl prescribed for treatment; advised to avoid alcohol while on this medication  - Medication sent to pharmacy    2. Urinary tract infection  - Urine test to be conducted to rule out infection and pregnancy  - UA showed small leukocytes, indicating a possible UTI  - Keflex prescribed for UTI treatment    3. Recurrent yeast infection  - High tendency to recurrent yeast infections after antibiotic use  - Diflucan prescribed for 2 doses          No follow-ups on file.       Subjective   HPI  History of Present Illness  The patient presents for evaluation of vaginal soreness and itching.    Vaginal Soreness and Itching  - She reports experiencing mild soreness and itching in her vaginal area.  - She has

## 2025-07-15 NOTE — ASSESSMENT & PLAN NOTE
Orders:    Vaginitis DNA Probe; Future    POCT urine pregnancy    fluconazole (DIFLUCAN) 150 MG tablet; Take 1 tablet by mouth every 72 hours for 6 days

## 2025-09-04 RX ORDER — SEMAGLUTIDE 0.5 MG/.5ML
0.5 INJECTION, SOLUTION SUBCUTANEOUS
Refills: 1 | OUTPATIENT
Start: 2025-09-04